# Patient Record
Sex: FEMALE | Race: OTHER | HISPANIC OR LATINO | Employment: FULL TIME | URBAN - METROPOLITAN AREA
[De-identification: names, ages, dates, MRNs, and addresses within clinical notes are randomized per-mention and may not be internally consistent; named-entity substitution may affect disease eponyms.]

---

## 2025-02-28 ENCOUNTER — OFFICE VISIT (OUTPATIENT)
Dept: OBSTETRICS AND GYNECOLOGY | Facility: CLINIC | Age: 20
End: 2025-02-28
Payer: MEDICAID

## 2025-02-28 VITALS
SYSTOLIC BLOOD PRESSURE: 128 MMHG | HEIGHT: 62 IN | WEIGHT: 256 LBS | BODY MASS INDEX: 47.11 KG/M2 | DIASTOLIC BLOOD PRESSURE: 80 MMHG

## 2025-02-28 DIAGNOSIS — E66.1 CLASS 3 DRUG-INDUCED OBESITY WITHOUT SERIOUS COMORBIDITY WITH BODY MASS INDEX (BMI) OF 40.0 TO 44.9 IN ADULT: ICD-10-CM

## 2025-02-28 DIAGNOSIS — E66.813 CLASS 3 DRUG-INDUCED OBESITY WITHOUT SERIOUS COMORBIDITY WITH BODY MASS INDEX (BMI) OF 40.0 TO 44.9 IN ADULT: ICD-10-CM

## 2025-02-28 DIAGNOSIS — N76.0 ACUTE VAGINITIS: ICD-10-CM

## 2025-02-28 DIAGNOSIS — N91.4 SECONDARY OLIGOMENORRHEA: Primary | ICD-10-CM

## 2025-02-28 PROCEDURE — 99213 OFFICE O/P EST LOW 20 MIN: CPT | Performed by: OBSTETRICS & GYNECOLOGY

## 2025-02-28 RX ORDER — LURASIDONE HYDROCHLORIDE 20 MG/1
20 TABLET, FILM COATED ORAL DAILY
COMMUNITY
Start: 2025-01-16

## 2025-02-28 RX ORDER — SERTRALINE HYDROCHLORIDE 100 MG/1
TABLET, FILM COATED ORAL
COMMUNITY
Start: 2025-02-20

## 2025-02-28 ASSESSMENT — PATIENT HEALTH QUESTIONNAIRE - PHQ9
SUM OF ALL RESPONSES TO PHQ9 QUESTIONS 1 AND 2: 2
2. FEELING DOWN, DEPRESSED OR HOPELESS: MORE THAN HALF THE DAYS
10. IF YOU CHECKED OFF ANY PROBLEMS, HOW DIFFICULT HAVE THESE PROBLEMS MADE IT FOR YOU TO DO YOUR WORK, TAKE CARE OF THINGS AT HOME, OR GET ALONG WITH OTHER PEOPLE: SOMEWHAT DIFFICULT
1. LITTLE INTEREST OR PLEASURE IN DOING THINGS: NOT AT ALL

## 2025-02-28 ASSESSMENT — ENCOUNTER SYMPTOMS
LOSS OF SENSATION IN FEET: 0
OCCASIONAL FEELINGS OF UNSTEADINESS: 0
DEPRESSION: 0

## 2025-02-28 ASSESSMENT — PAIN SCALES - GENERAL: PAINLEVEL_OUTOF10: 0-NO PAIN

## 2025-02-28 NOTE — PROGRESS NOTES
GYN OFFICE VISIT    Patient Name:  Kaitlynn Alvarez  :  2005  MR #:  [id number]  [id number]      ASSESSMENT/PLAN:     There are no diagnoses linked to this encounter.     Kaitlynn was seen today for new patient visit.  Diagnoses and all orders for this visit:  Secondary oligomenorrhea (Primary)  -     CBC; Future  -     Estradiol; Future  -     TSH with reflex to Free T4 if abnormal; Future  -     US PELVIS TRANSABDOMINAL WITH TRANSVAGINAL; Future  -     Follicle Stimulating Hormone; Future  -     Hemoglobin A1C; Future  -     Prolactin; Future  -     Testosterone,Free and Total; Future  -     17-Hydroxyprogesterone; Future  -     DHEA-Sulfate; Future  -     Luteinizing Hormone; Future  -     CBC  -     Estradiol  -     TSH with reflex to Free T4 if abnormal  -     Follicle Stimulating Hormone  -     Hemoglobin A1C  -     Prolactin  -     Testosterone,Free and Total  -     17-Hydroxyprogesterone  -     DHEA-Sulfate  -     Luteinizing Hormone  Acute vaginitis  -     Vaginitis Gram Stain For Bacterial Vaginosis + Yeast  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  Diagnosis explained in detail, including differential.  Educational material distributed.  Pelvic ultrasound.    Follow up in about 4 weeks (around 3/28/2025) for follow up.      Subjective    Chief Complaint   Patient presents with    New Patient Visit       Kaitlynn Alvarez is a 20 y.o.  No LMP recorded (lmp unknown).   female who presents for evaluation of irregular cycles and dscharge with odor, and pain with urination.  No major weight pain.  Has not initated sexual activity.  Skipped cycle last month, and did this 1 year ago. No change in skin or hair growth.  Cycles not heavy.  Menarche age 13.      Past Medical History:   Diagnosis Date    Depression        History reviewed. No pertinent surgical history.    Social History  "    Socioeconomic History    Marital status: Single     Spouse name: Not on file    Number of children: Not on file    Years of education: Not on file    Highest education level: Not on file   Occupational History    Not on file   Tobacco Use    Smoking status: Never    Smokeless tobacco: Never   Vaping Use    Vaping status: Never Used   Substance and Sexual Activity    Alcohol use: Never    Drug use: Never    Sexual activity: Never   Other Topics Concern    Not on file   Social History Narrative    Not on file     Social Drivers of Health     Financial Resource Strain: Not on file   Food Insecurity: Not on file   Transportation Needs: Not on file   Physical Activity: Not on file   Stress: Not on file   Social Connections: Not on file   Intimate Partner Violence: Not on file   Housing Stability: Not on file       No family history on file.    Prior to Admission medications    Medication Sig Start Date End Date Taking? Authorizing Provider   lurasidone (Latuda) 20 mg tablet Take 1 tablet (20 mg) by mouth once daily. Take with food. 1/16/25  Yes Historical Provider, MD   sertraline (Zoloft) 100 mg tablet  2/20/25  Yes Historical Provider, MD       Allergies   Allergen Reactions    Peanut Rash              OBJECTIVE:   /80   Ht 1.575 m (5' 2\")   Wt 116 kg (256 lb)   LMP  (LMP Unknown)   BMI 46.82 kg/m²   Body mass index is 46.82 kg/m².     Physical Exam  Constitutional:       General: She is not in acute distress.     Appearance: Normal appearance.   Cardiovascular:      Rate and Rhythm: Normal rate and regular rhythm.      Heart sounds: Normal heart sounds.   Pulmonary:      Effort: Pulmonary effort is normal.      Breath sounds: Normal breath sounds. No wheezing or rhonchi.   Abdominal:      General: Bowel sounds are normal. There is no distension.      Tenderness: There is no abdominal tenderness. There is no guarding.   Neurological:      Mental Status: She is alert.           Note: This dictation was " generated using Dragon voice recognition software. Please excuse any grammatical or spelling errors that may have occurred using the system.

## 2025-03-03 ENCOUNTER — HOSPITAL ENCOUNTER (OUTPATIENT)
Dept: RADIOLOGY | Facility: HOSPITAL | Age: 20
Discharge: HOME | End: 2025-03-03
Payer: MEDICAID

## 2025-03-03 DIAGNOSIS — N91.4 SECONDARY OLIGOMENORRHEA: ICD-10-CM

## 2025-03-03 LAB — BV SCORE VAG QL: NORMAL

## 2025-03-03 PROCEDURE — 76856 US EXAM PELVIC COMPLETE: CPT

## 2025-03-03 PROCEDURE — 76856 US EXAM PELVIC COMPLETE: CPT | Performed by: RADIOLOGY

## 2025-03-03 PROCEDURE — 76830 TRANSVAGINAL US NON-OB: CPT | Performed by: RADIOLOGY

## 2025-06-18 RX ORDER — LEVOTHYROXINE SODIUM 25 UG/1
25 TABLET ORAL DAILY
Qty: 90 TABLET | OUTPATIENT
Start: 2025-06-18